# Patient Record
Sex: FEMALE | Race: WHITE | NOT HISPANIC OR LATINO
[De-identification: names, ages, dates, MRNs, and addresses within clinical notes are randomized per-mention and may not be internally consistent; named-entity substitution may affect disease eponyms.]

---

## 2017-03-21 ENCOUNTER — RECORDS - HEALTHEAST (OUTPATIENT)
Dept: ADMINISTRATIVE | Facility: OTHER | Age: 57
End: 2017-03-21

## 2017-03-21 ENCOUNTER — OFFICE VISIT - HEALTHEAST (OUTPATIENT)
Dept: ALLERGY | Facility: CLINIC | Age: 57
End: 2017-03-21

## 2017-03-21 DIAGNOSIS — R09.81 NASAL CONGESTION: ICD-10-CM

## 2021-06-09 NOTE — PROGRESS NOTES
Assessment:    No found allergic sensitivity on testing.  Consider nonallergic vasomotor rhinitis,  also consider recurrent infections with new granddaughter and increased illness exposures.    Plan:    Astelin 2 sprays each nostril a.m. And p.m.  Recommend trial of Merari pot in the evening.  ____________________________________________________________________________                                                             Patient is here today for allergy evaluation.  She reports symptoms of rhinorrhea, sneezing.  Often her drainage is posterior.  She has had recurrent sinus infections.  She recently was placed on a 5 day course of prednisone and a two-week course of Ceftin.  She is finishing up that antibiotic.  She does feel slightly improved although many of her symptoms persist.  Most significant symptoms started one month ago.  This was upon returning to the [facility] [place].  Previously had been in [place].  She felt that it was a very sudden onset after arriving in [place].  She has had previous allergy testing when she was a teenager.  She does not recall those specifics.  No history of wheezing or shortness of breath although she does have coughing currently.  No current heartburn.  Maria has tried Claritin and Zyrtec in the past.  She tried Flonase a year ago for a month.    Review of symptoms:  as above otherwise negative.    Past medical history: Hypothyroidism, hypertension    Allergies: No known allergies to medications, latex , foods or hymenoptera venom.    Family History:  No known member of the family with allergy or asthma.    Social history: Currently lives in a house that was built in the 1980s.  She has been in this home for approximately one month.  She reports that her new home has new carpet and paint.  Previous home had plumbing problems and fluttering.  No pets in her current home.  Of note she has a 7-month-old grandchild that she has been seen frequently.  Her grandchild has  been ill frequently and attends .    Medications: Reviewed in chart.    Physical Exam:  General:  Alert and oriented.  Eyes:  Sclera clear.  Ears: TMs translucent grey with bony landmarks visible. Nose: Pale, boggy mucosal membranes.  Throat: Pink, mosit.  No lesions.  Neck: Supple.  No lymphadenopathy.  Lungs: CTA.  CV: Regular rate and rhythm. Extremities: Well perfused.  No clubbing or cyanosis. Skin: No rash.    Last Percutaneous Allergy Test Results  Trees  Saqib, White  1:20 H  (W/F in mm): 0/0 (03/21/17 1608)  Birch Mix 1:20 H (W/F in mm): 0/0 (03/21/17 1608)  [place]enn[place], Common 1:20 H (W/F in mm): 0/0 (03/21/17 1608)  Elm, American 1:20 H (W/F in mm): 0/0 (03/21/17 1608)  [place], Shagbark 1:20 H (W/F in mm): 0/0 (03/21/17 1608)  Maple, Hard/Sugar 1:20 H (W/F in mm): 0/0 (03/21/17 1608)  [place] Mix 1:20 H (W/F in mm): 0/0 (03/21/17 1608)  [place], Red 1:20 H (W/F in mm): 0/0 (03/21/17 1608)  [place], American 1:20 H (W/F in mm): 0/0 (03/21/17 1608)  [place]re Tree 1:20 H (W/F in mm): 0/0 (03/21/17 1608)  Dust Mites  D. Pteronyssinus Mite 30,000 AU/ML H (W/F in mm): 0/0 (03/21/17 1608)  D. Farinae Mite 30,000 AU/ML H (W/F in mm: 0/0 (03/21/17 1608)  Grasses  Grass Mix #4 10,000 BAU/ML H: 0/0 (03/21/17 1608)  Golden Grass 1:20 H (W/F in mm): 0/0 (03/21/17 1608)  Cockroach  Cockroach Mix 1:10 H (W/F in mm): 0/0 (03/21/17 1608)  Molds/Fungi  Alternaria Tenuis 1:10 H (W/F in mm): 0/0 (03/21/17 1608)  Aspergillus Fumigatus 1:10 H (W/F in mm): 0/0 (03/21/17 1608)  Homodendrum Cladosporioides 1:10 H (W/F in mm): 0/0 (03/21/17 1608)  Penicillin Notatum 1:10 H (W/F in mm): 0/0 (03/21/17 1608)  Epicoccum 1:10 H (W/F in mm): 0/0 (03/21/17 1608)  Weeds  Ragweed, Short 1:20 H (W/F in mm): 0/0 (03/21/17 1608)  Dock, Sorrel 1:20 H (W/F in mm): 0/0 (03/21/17 1608)  Lamb's Quarter 1:20 H (W/F in mm): 0/0 (03/21/17 1608)  Pigweed, Rough Red Root 1:20 H  (W/F in mm): 0/0 (03/21/17 1608)  Plantain, English 1:20 H  (W/F in mm):  0/0 (03/21/17 1608)  Sagebrush, Mugwort 1:20 H  (W/F in mm): 0/0 (03/21/17 1608)  Animal  Cat 10,000 BAU/ML H (W/F in mm): 0/0 (03/21/17 1608)  Dog 1:10 H (W/F in mm): 0/0 (03/21/17 1608)  Controls  Device Type: QUINTIP (03/21/17 1608)  Neg. control: 50% Glycerine/Saline H (W/F in mm): 0/0 (03/21/17 1608)  Pos. control: Histamine 6mg/ML (W/F in mms): 9/31 (03/21/17 1608)     This transcription uses voice recognition software, which may contain typographical errors.

## 2022-01-24 ENCOUNTER — LAB REQUISITION (OUTPATIENT)
Dept: LAB | Facility: CLINIC | Age: 62
End: 2022-01-24

## 2022-01-24 PROCEDURE — G0145 SCR C/V CYTO,THINLAYER,RESCR: HCPCS | Performed by: FAMILY MEDICINE

## 2022-01-24 PROCEDURE — 87624 HPV HI-RISK TYP POOLED RSLT: CPT | Performed by: FAMILY MEDICINE

## 2022-01-27 LAB
BKR LAB AP GYN ADEQUACY: NORMAL
BKR LAB AP GYN INTERPRETATION: NORMAL
BKR LAB AP HPV REFLEX: NORMAL
BKR LAB AP PREVIOUS ABNORMAL: NORMAL
PATH REPORT.COMMENTS IMP SPEC: NORMAL
PATH REPORT.COMMENTS IMP SPEC: NORMAL
PATH REPORT.RELEVANT HX SPEC: NORMAL

## 2022-01-31 LAB
HUMAN PAPILLOMA VIRUS 16 DNA: NEGATIVE
HUMAN PAPILLOMA VIRUS 18 DNA: NEGATIVE
HUMAN PAPILLOMA VIRUS FINAL DIAGNOSIS: NORMAL
HUMAN PAPILLOMA VIRUS OTHER HR: NEGATIVE